# Patient Record
Sex: FEMALE | Race: WHITE | URBAN - METROPOLITAN AREA
[De-identification: names, ages, dates, MRNs, and addresses within clinical notes are randomized per-mention and may not be internally consistent; named-entity substitution may affect disease eponyms.]

---

## 2017-10-26 ENCOUNTER — OFFICE VISIT (OUTPATIENT)
Dept: URGENT CARE | Facility: RETAIL CLINIC | Age: 42
End: 2017-10-26
Payer: COMMERCIAL

## 2017-10-26 VITALS
TEMPERATURE: 98.4 F | SYSTOLIC BLOOD PRESSURE: 129 MMHG | HEART RATE: 78 BPM | DIASTOLIC BLOOD PRESSURE: 80 MMHG | OXYGEN SATURATION: 98 %

## 2017-10-26 DIAGNOSIS — J01.90 ACUTE SINUSITIS WITH COEXISTING CONDITION, NEED PROPHYLACTIC TREATMENT: Primary | ICD-10-CM

## 2017-10-26 DIAGNOSIS — J02.9 ACUTE PHARYNGITIS, UNSPECIFIED ETIOLOGY: ICD-10-CM

## 2017-10-26 DIAGNOSIS — J06.9 VIRAL URI WITH COUGH: ICD-10-CM

## 2017-10-26 DIAGNOSIS — Z29.9 PROPHYLACTIC MEASURE: ICD-10-CM

## 2017-10-26 PROCEDURE — 99213 OFFICE O/P EST LOW 20 MIN: CPT | Performed by: PHYSICIAN ASSISTANT

## 2017-10-26 RX ORDER — METHYLPHENIDATE HYDROCHLORIDE 36 MG/1
36 TABLET ORAL
COMMUNITY
Start: 2017-09-18

## 2017-10-26 RX ORDER — BENZONATATE 100 MG/1
CAPSULE ORAL
Qty: 40 CAPSULE | Refills: 0 | Status: SHIPPED | OUTPATIENT
Start: 2017-10-26

## 2017-10-26 RX ORDER — FLUCONAZOLE 150 MG/1
150 TABLET ORAL ONCE
Qty: 1 TABLET | Refills: 0 | Status: SHIPPED | OUTPATIENT
Start: 2017-10-26 | End: 2017-10-26

## 2017-10-26 NOTE — MR AVS SNAPSHOT
After Visit Summary   10/26/2017    Jasmine MCKEON    MRN: [id number]           Patient Information     Date Of Birth          1975        Visit Information        Provider Department      10/26/2017 11:15 AM Margarita Elizabeth PA-C [facility]        Today's Diagnoses     Acute sinusitis with coexisting condition, need prophylactic treatment    -  1    Acute pharyngitis, unspecified etiology        Viral URI with cough          Care Instructions    Augmentin (amoxicillin-clavulanate) 875mg twice daily for 10 days as directed.  Tessalon Perles- take 1-2 capsules by mouth 3 times daily as needed for cough.  Flonase 2 sprays in each nostril daily until symptoms resolve, then continue 1 spray in each nostril for at least 5 more days.  Take Tylenol or an NSAID such as ibuprofen or naproxen as needed for pain.  Sudafed behind the pharmacist counter for 3-5 days helps relieve congestion  Afrin (oxymetazoline) nasal spray twice daily for 3 days. Stop after 3 days.  Mucinex (guiafenesin) thins mucus and may help it to loosen more quickly  Saline drops or nasal sprays may loosen mucus.  Sit in the bathroom with the door closed and hot shower running to loosen mucus.  Contact primary care clinic if you do not have any relief from your symptoms after 10 days.  Present to emergency room for significantly increasing pain, persistent high fever >102F, swelling/redness around your eyes, changes in your vision or ability to move your eyes, altered mental status or a severe headache.          Follow-ups after your visit        Who to contact     You can reach your care team any time of the day by calling [phone].  Notification of test results:  If you have an abnormal lab result, we will notify you by phone as soon as possible.         Additional Information About Your Visit        MyChart Information     [employer] gives you secure access to your electronic health record. If you see a  primary care provider, you can also send messages to your care team and make appointments. If you have questions, please call your primary care clinic.  If you do not have a primary care provider, please call [phone] and they will assist you.        Care EveryWhere ID     This is your Care EveryWhere ID. This could be used by other organizations to access your [facility] medical records  [id number]        Your Vitals Were     Pulse Temperature Pulse Oximetry             78 98.4  F (36.9  C) (Temporal) 98%          Blood Pressure from Last 3 Encounters:   10/26/17 129/80   01/29/16 137/81   08/12/15 109/76    Weight from Last 3 Encounters:   06/11/15 127 lb (57.6 kg)   04/17/15 133 lb (60.3 kg)   02/24/15 133 lb (60.3 kg)              We Performed the Following     BETA STREP GROUP A R/O CULTURE     RAPID STREP SCREEN          Today's Medication Changes          These changes are accurate as of: 10/26/17 11:29 AM.  If you have any questions, ask your nurse or doctor.               Start taking these medicines.        Dose/Directions    amoxicillin-clavulanate 875-125 MG per tablet   Commonly known as:  AUGMENTIN   Used for:  Acute sinusitis with coexisting condition, need prophylactic treatment        Dose:  1 tablet   Take 1 tablet by mouth 2 times daily for 10 days   Quantity:  20 tablet   Refills:  0       benzonatate 100 MG capsule   Commonly known as:  TESSALON   Used for:  Viral URI with cough        Take 1-2 capsules by mouth 3 times daily as needed for cough.   Quantity:  40 capsule   Refills:  0            Where to get your medicines      These medications were sent to [facility] - [address]  [address] [address]     Phone:  [phone]     amoxicillin-clavulanate 875-125 MG per tablet    benzonatate 100 MG capsule                Primary Care Provider Office Phone # Fax #    Thuy Melvin PA-C [phone]       [facility]  [facility] [address] [facility] [address]        Equal Access to Services     CELESTE MAYES : Hadii aad ku hadbellojony Sodebbie, waaxda luqadaha, qaybta kaalmaaiden newman, troy velasco. So [facility] [phone].    ATENCIÓN: Si habla español, tiene a stark disposición servicios gratuitos de asistencia lingüística. [facility] [phone].    We comply with applicable federal civil rights laws and [place] laws. We do not discriminate on the basis of race, color, national origin, age, disability, sex, sexual orientation, or gender identity.            Thank you!     Thank you for choosing [facility]  for your care. Our goal is always to provide you with excellent care. Hearing back from our patients is one way we can continue to improve our services. Please take a few minutes to complete the written survey that you may receive in the mail after your visit with us. Thank you!             Your Updated Medication List - Protect others around you: Learn how to safely use, store and throw away your medicines at www.disposemymeds.org.          This list is accurate as of: 10/26/17 11:29 AM.  Always use your most recent med list.                   Brand Name Dispense Instructions for use Diagnosis    amoxicillin-clavulanate 875-125 MG per tablet    AUGMENTIN    20 tablet    Take 1 tablet by mouth 2 times daily for 10 days    Acute sinusitis with coexisting condition, need prophylactic treatment       benzonatate 100 MG capsule    TESSALON    40 capsule    Take 1-2 capsules by mouth 3 times daily as needed for cough.    Viral URI with cough       ibuprofen 400 MG tablet    ADVIL/MOTRIN    60 tablet    Take 1-2 tablets (400-800 mg) by mouth every 6 hours as needed for pain    S/P  section       methylphenidate ER 36 MG CR tablet    CONCERTA     Take 36 mg by mouth        triamcinolone 0.1 % cream    KENALOG    80 g    Apply sparingly to affected area two times daily as needed     Dyshidrotic eczema

## 2017-10-26 NOTE — PATIENT INSTRUCTIONS
Augmentin (amoxicillin-clavulanate) 875mg twice daily for 10 days as directed.  Tessalon Perles- take 1-2 capsules by mouth 3 times daily as needed for cough.  Flonase 2 sprays in each nostril daily until symptoms resolve, then continue 1 spray in each nostril for at least 5 more days.  Take Tylenol or an NSAID such as ibuprofen or naproxen as needed for pain.  Sudafed behind the pharmacist counter for 3-5 days helps relieve congestion  Afrin (oxymetazoline) nasal spray twice daily for 3 days. Stop after 3 days.  Mucinex (guiafenesin) thins mucus and may help it to loosen more quickly  Saline drops or nasal sprays may loosen mucus.  Sit in the bathroom with the door closed and hot shower running to loosen mucus.  Contact primary care clinic if you do not have any relief from your symptoms after 10 days.  Present to emergency room for significantly increasing pain, persistent high fever >102F, swelling/redness around your eyes, changes in your vision or ability to move your eyes, altered mental status or a severe headache.

## 2017-10-26 NOTE — PROGRESS NOTES
Chief Complaint   Patient presents with     Pharyngitis     x 2 weeks, no fevers     Sinus Problem     x 3 days, sinus congestion     Cough     x 2 weeks     SUBJECTIVE:  Jasmine MCKEON is a 41 year old female here with concerns about sinus infection. Symptoms started about 2 weeks ago with a sore throat and a cough. Now has more post nasal drip and cough is becoming more productive. Frontal and maxillary sinus pain pressure started 3 days ago. Thick yellow nasal congestion and headaches. Symptoms started, improved and then worsened again.  She states onset of symptoms was 2 weeks ago.    Course of illness is worsening.   Severity moderate  She has had maxillary pressure as well as nasal congestion, cough, sore throat, facial pain/pressure and headache  Predisposing factors include tobacco abuse.   Recent treatment has included: OTC meds    Past Medical History:   Diagnosis Date     Abnormal Pap smear      ADHD (attention deficit hyperactivity disorder) 2006     Varicosities      Current Outpatient Prescriptions   Medication Sig Dispense Refill     methylphenidate ER (CONCERTA) 36 MG CR tablet Take 36 mg by mouth       amoxicillin-clavulanate (AUGMENTIN) 875-125 MG per tablet Take 1 tablet by mouth 2 times daily for 10 days 20 tablet 0     benzonatate (TESSALON) 100 MG capsule Take 1-2 capsules by mouth 3 times daily as needed for cough. 40 capsule 0     fluconazole (DIFLUCAN) 150 MG tablet Take 1 tablet (150 mg) by mouth once for 1 dose 1 tablet 0     ibuprofen (ADVIL,MOTRIN) 400 MG tablet Take 1-2 tablets (400-800 mg) by mouth every 6 hours as needed for pain 60 tablet 1     triamcinolone (KENALOG) 0.1 % cream Apply sparingly to affected area two times daily as needed (Patient not taking: Reported on 10/26/2017) 80 g 0     [DISCONTINUED] methylphenidate ER (BRAND OR BX/ZC/AUTHORIZED GENERIC) 54 MG CR tablet Take 1 tablet (54 mg) by mouth daily (Patient not taking: Reported on 10/26/2017) 30 tablet 0     Social  History   Substance Use Topics     Smoking status: Current Every Day Smoker     Packs/day: 0.50     Years: 15.00     Types: Cigarettes     Smokeless tobacco: Never Used      Comment: 1/2 PPD     Alcohol use Yes      Comment: occassional     No Known Allergies  ROS:  Review of systems negative except as stated above.    OBJECTIVE:  /80 (BP Location: Left arm)  Pulse 78  Temp 98.4  F (36.9  C) (Temporal)  SpO2 98%  GENERAL APPEARANCE: healthy, alert and no distress  EYES: PERRL, conjunctiva clear  HENT: Pain with palpation over frontal and maxillary sinuses. Ear canals normal TMs with mild serous effusions bilaterally. Nasal turbinates edematous and boggy with a blue hue bilaterally. Posterior pharynx is not erythematous.  NECK: supple, nontender, no lymphadenopathy  RESP: lungs clear to auscultation - no rales, rhonchi or wheezes  CV: regular rates and rhythm, normal S1 S2, no murmur noted    ASSESSMENT:    ICD-10-CM    1. Acute sinusitis with coexisting condition, need prophylactic treatment J01.90 amoxicillin-clavulanate (AUGMENTIN) 875-125 MG per tablet   2. Acute pharyngitis, unspecified etiology J02.9 CANCELED: RAPID STREP SCREEN     CANCELED: BETA STREP GROUP A R/O CULTURE   3. Viral URI with cough J06.9 benzonatate (TESSALON) 100 MG capsule    B97.89    4. Prophylactic measure Z29.9 fluconazole (DIFLUCAN) 150 MG tablet     PLAN:   Patient Instructions   Augmentin (amoxicillin-clavulanate) 875mg twice daily for 10 days as directed.  Tessalon Perles- take 1-2 capsules by mouth 3 times daily as needed for cough.  Flonase 2 sprays in each nostril daily until symptoms resolve, then continue 1 spray in each nostril for at least 5 more days.  Take Tylenol or an NSAID such as ibuprofen or naproxen as needed for pain.  Sudafed behind the pharmacist counter for 3-5 days helps relieve congestion  Afrin (oxymetazoline) nasal spray twice daily for 3 days. Stop after 3 days.  Mucinex (guiafenesin) thins mucus and  may help it to loosen more quickly  Saline drops or nasal sprays may loosen mucus.  Sit in the bathroom with the door closed and hot shower running to loosen mucus.  Contact primary care clinic if you do not have any relief from your symptoms after 10 days.  Present to emergency room for significantly increasing pain, persistent high fever >102F, swelling/redness around your eyes, changes in your vision or ability to move your eyes, altered mental status or a severe headache.    Follow up with primary care provider with any problems, questions or concerns or if symptoms worsen or fail to improve. Patient agreed to plan and verbalized understanding.    Elmira Elizabeth PA-C  [facility] [place]

## 2017-10-26 NOTE — NURSING NOTE
"Chief Complaint   Patient presents with     Pharyngitis     x 2 weeks, no fevers     Sinus Problem     x 3 days, sinus congestion     Cough     x 2 weeks       Initial /80 (BP Location: Left arm)  Pulse 78  Temp 98.4  F (36.9  C) (Temporal)  SpO2 98% Estimated body mass index is 20.97 kg/(m^2) as calculated from the following:    Height as of 6/11/15: 5' 5.25\" (1.657 m).    Weight as of 6/11/15: 127 lb (57.6 kg).  Medication Reconciliation: complete    "